# Patient Record
Sex: FEMALE | Race: WHITE | NOT HISPANIC OR LATINO | Employment: FULL TIME | URBAN - METROPOLITAN AREA
[De-identification: names, ages, dates, MRNs, and addresses within clinical notes are randomized per-mention and may not be internally consistent; named-entity substitution may affect disease eponyms.]

---

## 2023-08-10 ENCOUNTER — OFFICE VISIT (OUTPATIENT)
Dept: PRIMARY CARE | Facility: CLINIC | Age: 23
End: 2023-08-10
Payer: COMMERCIAL

## 2023-08-10 ENCOUNTER — LAB (OUTPATIENT)
Dept: LAB | Facility: LAB | Age: 23
End: 2023-08-10
Payer: COMMERCIAL

## 2023-08-10 VITALS
DIASTOLIC BLOOD PRESSURE: 60 MMHG | BODY MASS INDEX: 20.38 KG/M2 | HEIGHT: 63 IN | WEIGHT: 115 LBS | SYSTOLIC BLOOD PRESSURE: 98 MMHG

## 2023-08-10 DIAGNOSIS — R53.83 OTHER FATIGUE: Primary | ICD-10-CM

## 2023-08-10 DIAGNOSIS — R53.83 OTHER FATIGUE: ICD-10-CM

## 2023-08-10 PROBLEM — J11.1 FLU: Status: ACTIVE | Noted: 2023-08-10

## 2023-08-10 PROBLEM — R05.9 COUGH: Status: ACTIVE | Noted: 2023-08-10

## 2023-08-10 PROBLEM — R09.81 NASAL CONGESTION: Status: ACTIVE | Noted: 2023-08-10

## 2023-08-10 PROBLEM — E78.1 HIGH TRIGLYCERIDES: Status: ACTIVE | Noted: 2023-08-10

## 2023-08-10 PROBLEM — J03.00 ACUTE NON-RECURRENT STREPTOCOCCAL TONSILLITIS: Status: ACTIVE | Noted: 2023-08-10

## 2023-08-10 PROBLEM — M54.2 NECK PAIN: Status: ACTIVE | Noted: 2023-08-10

## 2023-08-10 PROBLEM — R51.9 HEADACHE: Status: ACTIVE | Noted: 2023-08-10

## 2023-08-10 PROBLEM — J02.9 VIRAL PHARYNGITIS: Status: ACTIVE | Noted: 2023-08-10

## 2023-08-10 PROBLEM — B00.1 HERPES LABIALIS: Status: ACTIVE | Noted: 2023-08-10

## 2023-08-10 PROBLEM — L70.9 ACNE: Status: ACTIVE | Noted: 2023-08-10

## 2023-08-10 PROBLEM — F41.9 ANXIETY: Status: ACTIVE | Noted: 2023-08-10

## 2023-08-10 PROBLEM — R20.2 PARESTHESIA: Status: ACTIVE | Noted: 2023-08-10

## 2023-08-10 PROBLEM — K12.0 CANKER SORES ORAL: Status: ACTIVE | Noted: 2023-08-10

## 2023-08-10 PROBLEM — R00.2 PALPITATIONS: Status: ACTIVE | Noted: 2023-08-10

## 2023-08-10 PROBLEM — J03.90 TONSILLITIS: Status: ACTIVE | Noted: 2023-08-10

## 2023-08-10 PROBLEM — R09.81 SINUS CONGESTION: Status: ACTIVE | Noted: 2023-08-10

## 2023-08-10 PROBLEM — R11.2 NAUSEA AND VOMITING: Status: ACTIVE | Noted: 2023-08-10

## 2023-08-10 LAB
ALANINE AMINOTRANSFERASE (SGPT) (U/L) IN SER/PLAS: 8 U/L (ref 7–45)
ALBUMIN (G/DL) IN SER/PLAS: 4.4 G/DL (ref 3.4–5)
ALKALINE PHOSPHATASE (U/L) IN SER/PLAS: 51 U/L (ref 33–110)
ANION GAP IN SER/PLAS: 11 MMOL/L (ref 10–20)
ASPARTATE AMINOTRANSFERASE (SGOT) (U/L) IN SER/PLAS: 14 U/L (ref 9–39)
BILIRUBIN TOTAL (MG/DL) IN SER/PLAS: 0.8 MG/DL (ref 0–1.2)
CALCIUM (MG/DL) IN SER/PLAS: 9.6 MG/DL (ref 8.6–10.6)
CARBON DIOXIDE, TOTAL (MMOL/L) IN SER/PLAS: 28 MMOL/L (ref 21–32)
CHLORIDE (MMOL/L) IN SER/PLAS: 104 MMOL/L (ref 98–107)
CREATININE (MG/DL) IN SER/PLAS: 0.78 MG/DL (ref 0.5–1.05)
ERYTHROCYTE DISTRIBUTION WIDTH (RATIO) BY AUTOMATED COUNT: 12.2 % (ref 11.5–14.5)
ERYTHROCYTE MEAN CORPUSCULAR HEMOGLOBIN CONCENTRATION (G/DL) BY AUTOMATED: 32.6 G/DL (ref 32–36)
ERYTHROCYTE MEAN CORPUSCULAR VOLUME (FL) BY AUTOMATED COUNT: 89 FL (ref 80–100)
ERYTHROCYTES (10*6/UL) IN BLOOD BY AUTOMATED COUNT: 5.4 X10E12/L (ref 4–5.2)
GFR FEMALE: >90 ML/MIN/1.73M2
GLUCOSE (MG/DL) IN SER/PLAS: 72 MG/DL (ref 74–99)
HEMATOCRIT (%) IN BLOOD BY AUTOMATED COUNT: 47.8 % (ref 36–46)
HEMOGLOBIN (G/DL) IN BLOOD: 15.6 G/DL (ref 12–16)
LEUKOCYTES (10*3/UL) IN BLOOD BY AUTOMATED COUNT: 7.2 X10E9/L (ref 4.4–11.3)
NRBC (PER 100 WBCS) BY AUTOMATED COUNT: 0 /100 WBC (ref 0–0)
PLATELETS (10*3/UL) IN BLOOD AUTOMATED COUNT: 233 X10E9/L (ref 150–450)
POTASSIUM (MMOL/L) IN SER/PLAS: 4 MMOL/L (ref 3.5–5.3)
PROTEIN TOTAL: 7.2 G/DL (ref 6.4–8.2)
SODIUM (MMOL/L) IN SER/PLAS: 139 MMOL/L (ref 136–145)
THYROTROPIN (MIU/L) IN SER/PLAS BY DETECTION LIMIT <= 0.05 MIU/L: 1.07 MIU/L (ref 0.44–3.98)
UREA NITROGEN (MG/DL) IN SER/PLAS: 15 MG/DL (ref 6–23)

## 2023-08-10 PROCEDURE — 36415 COLL VENOUS BLD VENIPUNCTURE: CPT

## 2023-08-10 PROCEDURE — 86003 ALLG SPEC IGE CRUDE XTRC EA: CPT

## 2023-08-10 PROCEDURE — 1036F TOBACCO NON-USER: CPT | Performed by: INTERNAL MEDICINE

## 2023-08-10 PROCEDURE — 82785 ASSAY OF IGE: CPT

## 2023-08-10 PROCEDURE — 85027 COMPLETE CBC AUTOMATED: CPT

## 2023-08-10 PROCEDURE — 80053 COMPREHEN METABOLIC PANEL: CPT

## 2023-08-10 PROCEDURE — 84443 ASSAY THYROID STIM HORMONE: CPT

## 2023-08-10 PROCEDURE — 99213 OFFICE O/P EST LOW 20 MIN: CPT | Performed by: INTERNAL MEDICINE

## 2023-08-10 RX ORDER — SPIRONOLACTONE 100 MG/1
100 TABLET, FILM COATED ORAL NIGHTLY
COMMUNITY
Start: 2023-08-01 | End: 2024-04-25 | Stop reason: WASHOUT

## 2023-08-10 RX ORDER — NORGESTIMATE AND ETHINYL ESTRADIOL 7DAYSX3 LO
1 KIT ORAL DAILY
COMMUNITY
End: 2024-01-22 | Stop reason: SDUPTHER

## 2023-08-10 ASSESSMENT — ENCOUNTER SYMPTOMS: FATIGUE: 1

## 2023-08-10 NOTE — PROGRESS NOTES
"Subjective   Patient ID: Flor Potter is a 23 y.o. female who presents for Fatigue (On going x1 month).    Fatigue  Associated symptoms include fatigue.   She presents with complaints of having extreme fatigue for past 1 months she saw her psychic and energy healer and they recommended to get checked for thyroid  She is always stuffy she gets sometimes wheezy in the lung  She does have a daughter    Patient is non-smoker social drinker  Does office job    Past recap   Patient is here with complaints of having sinus congestion chest congestion headache stomach hurting  Her mother is positive for Covid she contracted infection from her work  Patient denies any shortness of breath denies cough        to est PCP   feel emotional off and on   In April patient felt very emotional for few days and then she felt better until a week ago when she started feeling very emotional again  She has regular periods  She feels this time it's happening a week before her period  Smallest things ruins her day, extra since still gets upset easily, crying a lot  She does have a boyfriend but no breakouts  She is a day scholar, she is taking summer courses and working as   Not enjoying the work  Was on Accutane until a year ago for acne     Review of Systems   Constitutional:  Positive for fatigue.       Objective   BP 98/60   Ht 1.6 m (5' 3\")   Wt 52.2 kg (115 lb)   BMI 20.37 kg/m²     Physical Exam  Vitals reviewed.   Constitutional:       Appearance: Normal appearance.   HENT:      Head: Normocephalic and atraumatic.      Right Ear: Tympanic membrane, ear canal and external ear normal.      Left Ear: Tympanic membrane, ear canal and external ear normal.      Nose: Nose normal.      Mouth/Throat:      Pharynx: Oropharynx is clear.   Eyes:      Extraocular Movements: Extraocular movements intact.      Conjunctiva/sclera: Conjunctivae normal.      Pupils: Pupils are equal, round, and reactive to light. "   Cardiovascular:      Rate and Rhythm: Normal rate and regular rhythm.      Pulses: Normal pulses.      Heart sounds: Normal heart sounds.   Pulmonary:      Effort: Pulmonary effort is normal.      Breath sounds: Normal breath sounds.   Abdominal:      General: Abdomen is flat. Bowel sounds are normal.      Palpations: Abdomen is soft.   Musculoskeletal:      Cervical back: Normal range of motion and neck supple.   Skin:     General: Skin is warm and dry.   Neurological:      General: No focal deficit present.      Mental Status: She is alert and oriented to person, place, and time.   Psychiatric:         Mood and Affect: Mood normal.       Assessment/Plan   Problem List Items Addressed This Visit    None  Visit Diagnoses       Other fatigue    -  Primary    Relevant Orders    CBC (Completed)    Comprehensive Metabolic Panel (Completed)    TSH with reflex to Free T4 if abnormal (Completed)    Respiratory Allergy Profile IgE (Completed)    Food Allergy Profile IgE (Completed)          past recap  Patient's old chart review  #1 high triglycerides  Will recheck if it was just related to Accutane  #2 palpitations  We will do blood work to rule out any medical cause  #3 anxiety  Long counseling session done  Patient has questions about supplements explain not able to give much information but she can try aromatic therapies which does help her relax  Advised the over and breathing exercises  Advised to consider lifestyle   Patient does not want to take medications but if her anxiety is are not better she should be on medication  Follow-up after blood work     11/24  Covid testing ordered for patient  Advised patient to quarantine  Stay hydrated and rest     8/10/2023  Will order thyroid test  CBC to rule out anemia  Will do allergy test to see if patient has allergies causing sinus congestion  Sinus congestion could be causing her fatigue to  Follow-up after the test

## 2023-08-11 LAB
ALLERGEN ANIMAL: CAT DANDER IGE (KU/L): <0.1 KU/L
ALLERGEN ANIMAL: DOG DANDER IGE (KU/L): 0.12 KU/L
ALLERGEN FOOD: CLAM (RUDITAPES SPP.) IGE (KU/L): <0.1 KU/L
ALLERGEN FOOD: EGG WHITE IGE (KU/L): <0.1 KU/L
ALLERGEN FOOD: FISH (COD) GADUS MORHUA) IGE (KU/L): <0.1 KU/L
ALLERGEN FOOD: MAIZE, CORN (ZEA MAYS) IGE (KU/L): <0.1 KU/L
ALLERGEN FOOD: MILK IGE (KU/L): <0.1 KU/L
ALLERGEN FOOD: PEANUT (ARACHIS HYPOGAEA) IGE (KU/L): <0.1 KU/L
ALLERGEN FOOD: SCALLOP (PECTEN SPP.) IGE (KU/L): <0.1 KU/L
ALLERGEN FOOD: SESAME SEED (SESAMUM INDICUM) IGE (KU/L): <0.1 KU/L
ALLERGEN FOOD: SHRIMP (P. BOREALIS/MONODON, M. BARBATA/JOYNERI) IGE (KU/L): <0.1 KU/L
ALLERGEN FOOD: SOYBEAN (GLYCINE MAX) IGE (KU/L): <0.1 KU/L
ALLERGEN FOOD: WALNUT (JUGLANS SPP.) IGE (KU/L): <0.1 KU/L
ALLERGEN FOOD: WHEAT (TRITICUM AESTIVUM) IGE (KU/L): <0.1 KU/L
ALLERGEN GRASS: BERMUDA GRASS (CYNODON DACTYLON) IGE (KU/L): <0.1 KU/L
ALLERGEN GRASS: JOHNSON GRASS (SORGHUM HALEPENSE) IGE (KU/L): <0.1 KU/L
ALLERGEN GRASS: MEADOW GRASS, KENTUCKY BLUE (POA PRATENSIS )IGE (KU/L): <0.1 KU/L
ALLERGEN GRASS: TIMOTHY GRASS (PHLEUM PRATENSE) IGE (KU/L): <0.1 KU/L
ALLERGEN INSECT: COCKROACH IGE: <0.1 KU/L
ALLERGEN MICROORGANISM: ALTERNARIA ALTERNATA IGE (KU/L): <0.1 KU/L
ALLERGEN MICROORGANISM: ASPERGILLUS FUMIGATUS IGE (KU/L): <0.1 KU/L
ALLERGEN MICROORGANISM: CLADOSPORIUM HERBARUM IGE (KU/L): <0.1 KU/L
ALLERGEN MICROORGANISM: PENICILLIUM CHRYSOGENUM (P. NOTATUM) IGE (KU/L): <0.1 KU/L
ALLERGEN MITE: DERMATOPHAGOIDES FARINAE (HOUSE DUST MITE) IGE (KU/L): 0.26 KU/L
ALLERGEN MITE: DERMATOPHAGOIDES PTERONYSSINUS (HOUSE DUST MITE) IGE (KU/L): 0.44 KU/L
ALLERGEN TREE: BOX-ELDER (ACER NEGUNDO) IGE (KU/L): 1.22 KU/L
ALLERGEN TREE: COMMON SILVER BIRCH (BETULA VERRUCOSA) IGE (KU/L): <0.1 KU/L
ALLERGEN TREE: COTTONWOOD (POPULUS DELTOIDES) IGE (KU/L): <0.1 KU/L
ALLERGEN TREE: ELM (ULMUS AMERICANA) IGE (KU/L): <0.1 KU/L
ALLERGEN TREE: MAPLE LEAF SYCAMORE, LONDON PLANE IGE (KU/L): <0.1 KU/L
ALLERGEN TREE: MOUNTAIN JUNIPER (JUNIPERUS SABINOIDES) IGE (KU/L): <0.1 KU/L
ALLERGEN TREE: MULBERRY (MORUS ALBA) IGE (KU/L): <0.1 KU/L
ALLERGEN TREE: OAK (QUERCUS ALBA) IGE (KU/L): <0.1 KU/L
ALLERGEN TREE: PECAN, HICKORY (CARYA PECAN) IGE (KU/L): <0.1 KU/L
ALLERGEN TREE: WALNUT IGE: <0.1 KU/L
ALLERGEN TREE: WHITE ASH (FRAXINUS AMERICANA) IGE (KU/L): <0.1 KU/L
ALLERGEN WEED: COMMON PIGWEED (AMARANTHUS RETROFLEXUS) IGE (KU/L): <0.1 KU/L
ALLERGEN WEED: COMMON RAGWEED (AMB. ARTEMISIIFOLIA/A. ELATIOR) IGE (KU/L): <0.1 KU/L
ALLERGEN WEED: GOOSEFOOT, LAMB'S QUARTERS (CHENOPODIUM ALBUM) IGE (KU/L): <0.1 KU/L
ALLERGEN WEED: PLANTAIN (ENGLISH), RIBWORT (PLANTAGO LANCEOLATA) IGE (KU/L): <0.1 KU/L
ALLERGEN WEED: PRICKLY SALTWORT/RUSSIAN THISTLE (SALSOLA KALI) IGE (KU/L): <0.1 KU/L
ALLERGEN WEED: SHEEP SORREL (RUMEX ACETOSELLA) IGE (KU/L): <0.1 KU/L
IMMUNOCAP IGE: 370 KU/L (ref 0–214)
IMMUNOCAP INTERPRETATION: ABNORMAL
IMMUNOCAP INTERPRETATION: NORMAL

## 2023-08-17 ENCOUNTER — APPOINTMENT (OUTPATIENT)
Dept: PRIMARY CARE | Facility: CLINIC | Age: 23
End: 2023-08-17
Payer: COMMERCIAL

## 2023-08-24 ENCOUNTER — OFFICE VISIT (OUTPATIENT)
Dept: PRIMARY CARE | Facility: CLINIC | Age: 23
End: 2023-08-24
Payer: COMMERCIAL

## 2023-08-24 VITALS
BODY MASS INDEX: 20.14 KG/M2 | SYSTOLIC BLOOD PRESSURE: 102 MMHG | HEIGHT: 64 IN | WEIGHT: 118 LBS | DIASTOLIC BLOOD PRESSURE: 64 MMHG

## 2023-08-24 DIAGNOSIS — R05.1 ACUTE COUGH: ICD-10-CM

## 2023-08-24 DIAGNOSIS — E16.2 LOW BLOOD SUGAR: Primary | ICD-10-CM

## 2023-08-24 DIAGNOSIS — R71.8 ELEVATED HEMATOCRIT: ICD-10-CM

## 2023-08-24 DIAGNOSIS — B37.9 YEAST INFECTION: ICD-10-CM

## 2023-08-24 PROCEDURE — 99213 OFFICE O/P EST LOW 20 MIN: CPT | Performed by: INTERNAL MEDICINE

## 2023-08-24 PROCEDURE — 1036F TOBACCO NON-USER: CPT | Performed by: INTERNAL MEDICINE

## 2023-08-24 RX ORDER — NYSTATIN 100000 U/G
CREAM TOPICAL 2 TIMES DAILY
Qty: 30 G | Refills: 0 | Status: SHIPPED | OUTPATIENT
Start: 2023-08-24 | End: 2023-08-31

## 2023-08-24 RX ORDER — FLUCONAZOLE 150 MG/1
TABLET ORAL
COMMUNITY
End: 2023-08-24 | Stop reason: SDUPTHER

## 2023-08-24 RX ORDER — FLUCONAZOLE 150 MG/1
TABLET ORAL
Qty: 1 TABLET | Refills: 1 | Status: SHIPPED | OUTPATIENT
Start: 2023-08-24

## 2023-08-24 NOTE — PROGRESS NOTES
"Subjective   Patient ID: Flor Potter is a 23 y.o. female who presents for Follow-up.    HPI   Patient is here for follow-up on the blood work  She has questions about low blood sugar and elevated RBC and hematocrit on the blood work  Complaining of having vaginal infection gets fungal infection she blames it to the birth control.  She is really concerned about taking medication but it gives her side effects  She wants Diflucan as nystatin does not work well for her  She does hot yoga for 5 times a week  She did not do the lung function test      Past recap   she presents with complaints of having extreme fatigue for past 1 months she saw her psychic and energy healer and they recommended to get checked for thyroid  She is always stuffy she gets sometimes wheezy in the lung     Patient is non-smoker social drinker  Does office job     Patient is here with complaints of having sinus congestion chest congestion headache stomach hurting  Her mother is positive for Covid she contracted infection from her work  Patient denies any shortness of breath denies cough     to est PCP   feel emotional off and on   In April patient felt very emotional for few days and then she felt better until a week ago when she started feeling very emotional again  She has regular periods  She feels this time it's happening a week before her period  Smallest things ruins her day, extra since still gets upset easily, crying a lot  She does have a boyfriend but no breakouts  She is a day scholar, she is taking summer courses and working as   Not enjoying the work  Was on Accutane until a year ago for acne   Review of Systems    Objective   /64   Ht 1.626 m (5' 4\")   Wt 53.5 kg (118 lb)   BMI 20.25 kg/m²     Physical Exam  Vitals reviewed.   Constitutional:       Appearance: Normal appearance.   HENT:      Head: Normocephalic and atraumatic.      Right Ear: Tympanic membrane, ear canal and external ear normal.      " Left Ear: Tympanic membrane, ear canal and external ear normal.      Nose: Nose normal.      Mouth/Throat:      Pharynx: Oropharynx is clear.   Eyes:      Extraocular Movements: Extraocular movements intact.      Conjunctiva/sclera: Conjunctivae normal.      Pupils: Pupils are equal, round, and reactive to light.   Cardiovascular:      Rate and Rhythm: Normal rate and regular rhythm.      Pulses: Normal pulses.      Heart sounds: Normal heart sounds.   Pulmonary:      Effort: Pulmonary effort is normal.      Breath sounds: Normal breath sounds.   Abdominal:      General: Abdomen is flat. Bowel sounds are normal.      Palpations: Abdomen is soft.   Musculoskeletal:      Cervical back: Normal range of motion and neck supple.   Skin:     General: Skin is warm and dry.   Neurological:      General: No focal deficit present.      Mental Status: She is alert and oriented to person, place, and time.   Psychiatric:         Mood and Affect: Mood normal.         Assessment/Plan   Problem List Items Addressed This Visit          Pulmonary and Pneumonias    Cough    Relevant Orders    Pulmonary function testing     Other Visit Diagnoses       Low blood sugar    -  Primary    Yeast infection        Relevant Medications    fluconazole (Diflucan) 150 mg tablet    nystatin (Mycostatin) cream    Elevated hematocrit                past recap  Patient's old chart review  #1 high triglycerides  Will recheck if it was just related to Accutane  #2 palpitations  We will do blood work to rule out any medical cause  #3 anxiety  Long counseling session done  Patient has questions about supplements explain not able to give much information but she can try aromatic therapies which does help her relax  Advised the over and breathing exercises  Advised to consider lifestyle   Patient does not want to take medications but if her anxiety is are not better she should be on medication  Follow-up after blood work     11/24  Covid testing ordered  for patient  Advised patient to quarantine  Stay hydrated and rest      8/10/2023  Will order thyroid test  CBC to rule out anemia  Will do allergy test to see if patient has allergies causing sinus congestion  Sinus congestion could be causing her fatigue to  Follow-up after the test    8/24/2023  Blood work reviewed  Low blood sugar reactive hypoglycemia  Increase water fiber intake  Elevated hematocrit most likely from dehydration increase water intake  PFTs  Vaginal infection probably from heart failure she does for 5 times a week as patient does not change the clothes right away  Encouraged avoid sweating keep the skin dry and keep cotton clothing  Prescription for Diflucan and nystatin cream given

## 2023-12-15 ENCOUNTER — LAB (OUTPATIENT)
Dept: LAB | Facility: LAB | Age: 23
End: 2023-12-15
Payer: COMMERCIAL

## 2023-12-15 DIAGNOSIS — B35.1 TINEA UNGUIUM: Primary | ICD-10-CM

## 2023-12-15 LAB
ALBUMIN SERPL-MCNC: 4.1 G/DL (ref 3.5–5)
ALP BLD-CCNC: 49 U/L (ref 35–125)
ALT SERPL-CCNC: 11 U/L (ref 5–40)
AST SERPL-CCNC: 18 U/L (ref 5–40)
BILIRUB DIRECT SERPL-MCNC: <0.2 MG/DL (ref 0–0.2)
BILIRUB SERPL-MCNC: 0.4 MG/DL (ref 0.1–1.2)
PROT SERPL-MCNC: 6.4 G/DL (ref 5.9–7.9)

## 2023-12-15 PROCEDURE — 80076 HEPATIC FUNCTION PANEL: CPT

## 2023-12-15 PROCEDURE — 36415 COLL VENOUS BLD VENIPUNCTURE: CPT

## 2024-01-22 DIAGNOSIS — Z30.41 SURVEILLANCE FOR BIRTH CONTROL, ORAL CONTRACEPTIVES: Primary | ICD-10-CM

## 2024-01-22 RX ORDER — NORGESTIMATE AND ETHINYL ESTRADIOL 7DAYSX3 LO
1 KIT ORAL DAILY
Qty: 28 TABLET | Refills: 4 | Status: SHIPPED | OUTPATIENT
Start: 2024-01-22 | End: 2024-04-25 | Stop reason: SDUPTHER

## 2024-04-25 ENCOUNTER — OFFICE VISIT (OUTPATIENT)
Dept: OBSTETRICS AND GYNECOLOGY | Facility: CLINIC | Age: 24
End: 2024-04-25
Payer: COMMERCIAL

## 2024-04-25 VITALS
DIASTOLIC BLOOD PRESSURE: 80 MMHG | WEIGHT: 125.6 LBS | BODY MASS INDEX: 21.44 KG/M2 | HEIGHT: 64 IN | SYSTOLIC BLOOD PRESSURE: 114 MMHG

## 2024-04-25 DIAGNOSIS — Z23 NEED FOR HPV VACCINATION: ICD-10-CM

## 2024-04-25 DIAGNOSIS — Z30.41 SURVEILLANCE FOR BIRTH CONTROL, ORAL CONTRACEPTIVES: ICD-10-CM

## 2024-04-25 DIAGNOSIS — Z11.3 SCREEN FOR STD (SEXUALLY TRANSMITTED DISEASE): ICD-10-CM

## 2024-04-25 DIAGNOSIS — Z01.419 WELL WOMAN EXAM: Primary | ICD-10-CM

## 2024-04-25 PROCEDURE — 99395 PREV VISIT EST AGE 18-39: CPT | Performed by: OBSTETRICS & GYNECOLOGY

## 2024-04-25 PROCEDURE — 90471 IMMUNIZATION ADMIN: CPT | Performed by: OBSTETRICS & GYNECOLOGY

## 2024-04-25 PROCEDURE — 90651 9VHPV VACCINE 2/3 DOSE IM: CPT | Performed by: OBSTETRICS & GYNECOLOGY

## 2024-04-25 RX ORDER — NORGESTIMATE AND ETHINYL ESTRADIOL 7DAYSX3 LO
KIT ORAL
Qty: 84 TABLET | Refills: 3 | Status: SHIPPED | OUTPATIENT
Start: 2024-04-25 | End: 2024-06-03 | Stop reason: SDUPTHER

## 2024-04-25 ASSESSMENT — ENCOUNTER SYMPTOMS
OCCASIONAL FEELINGS OF UNSTEADINESS: 0
DEPRESSION: 0
LOSS OF SENSATION IN FEET: 0

## 2024-04-25 ASSESSMENT — LIFESTYLE VARIABLES
AUDIT-C TOTAL SCORE: 1
SKIP TO QUESTIONS 9-10: 1
HOW OFTEN DO YOU HAVE SIX OR MORE DRINKS ON ONE OCCASION: NEVER
HOW OFTEN DO YOU HAVE A DRINK CONTAINING ALCOHOL: MONTHLY OR LESS
HOW MANY STANDARD DRINKS CONTAINING ALCOHOL DO YOU HAVE ON A TYPICAL DAY: 1 OR 2

## 2024-04-25 ASSESSMENT — PATIENT HEALTH QUESTIONNAIRE - PHQ9
1. LITTLE INTEREST OR PLEASURE IN DOING THINGS: NOT AT ALL
SUM OF ALL RESPONSES TO PHQ9 QUESTIONS 1 & 2: 0
2. FEELING DOWN, DEPRESSED OR HOPELESS: NOT AT ALL

## 2024-04-25 ASSESSMENT — PAIN SCALES - GENERAL: PAINLEVEL: 0-NO PAIN

## 2024-04-25 NOTE — PROGRESS NOTES
Patient Name:  Flor Potter  :  2000  MR #:  [id number]  Acct #:  [id number]      Assessment:  1. Well woman exam      2. Screen for STD (sexually transmitted disease)      3. Surveillance for birth control, oral contraceptives    - Tri-Lo-Jessica 0.18/0.215/0.25 mg-25 mcg tablet; Take 1 tablet PO daily  Dispense: 84 tablet; Refill: 3    4. Need for HPV vaccination    - HPV 9-valent vaccine (GARDASIL 9)     -PAP every 3 years if normal. Next pap due   -STI screening declined  -Birth control counseling:  Barrier methods of contraception encouraged for BC and for prevention of STIs.  Selecting Ortho Tricyclen for skin tx also.  -Preventative hygiene habits.  -Health promoting habits: Exercise ie. aerobics, yoga/pilates, weight bearing exercises 3 to 5 days/week. Diet ie. Meals with balanced portions of healthy fats, complex carbohydrates, and protein;  Take in adequate calcium and vitamin D3; drink plenty of water each day.  -Medication education:  Education:  All new and/or current medications discussed and reviewed including side effects with patient/caregiver, Understanding:  Caregiver/Patient expressed understanding., Adherence:  Barriers to adherence identified and discussed if present.  -Vaccine counseling: Finish series for full protection      Chief Complaint:  Annual exam    Subjective:  HPI:  25 yo CF for annual exam. Desires STD screen, BC. Started Gardasil vaccination last year.          Birth control: OCP        Cycles: Regular.     GYNH:   Yes, first onset 9  LMP:  Patient's last menstrual period was 2024.  HPV Vaccine Has not completed series from . Overdue for 3rd injection.  Sexual activity Yes - . Coitarche Yes - .  Birth control history     Past Medical History:   Diagnosis Date    Acne, unspecified     Acne, unspecified acne type    Encounter for immunization     Varicella vaccination    Encounter for immunization     Need for HPV vaccination        Past Surgical History:    Procedure Laterality Date    OTHER SURGICAL HISTORY  10/13/2016    Biopsy Skin        OB History          0    Para   0    Term   0       0    AB   0    Living   0         SAB   0    IAB   0    Ectopic   0    Multiple   0    Live Births   0                  Prior to Admission medications    Medication Sig Start Date End Date Taking? Authorizing Provider   fluconazole (Diflucan) 150 mg tablet Take 1 tab po once, repeat in 72 hours if no improvement, qty # 3 23   Melanie Treadwell MD   Tri-Lo-Jessica 0.18/0.215/0.25 mg-25 mcg tablet Take 1 tablet by mouth once daily. 24   Ekaterina Celaya DO   spironolactone (Aldactone) 100 mg tablet Take 1 tablet (100 mg) by mouth once daily at bedtime. 23  Historical Provider, MD       Allergies   Allergen Reactions    Amoxicillin Anaphylaxis and Rash    Minocycline Hives       ROS:   [facility] - WOMEN ONLY:          Breast Lump No acute changes.  Hot Flashes Occasional.  Painful [place] no.  Vaginal Discharge no.      [facility] - ROS Update:          Unexplained Weight Change no.  Pain anywhere in your body no.  Black or bloody stools no.  Problems with urination no.  Rashes or sores Acne (hx of spironolactone  use).  Sexual problems no.  Depression or anxiety problems no.  Do you feel threatened by anyone No.          Objective:  Physical Examination:      GENERAL:          General Appearance:  well-developed, well-nourished, no functional handicap, well-groomed.          Hygiene:  good.          Ill-appearance:  none.          Mental Status:  alert and oriented.          Mood/Affect:  pleasant, appropriate.          Speech:  clear.          Eye contact:  normal.          Appears stated age:  yes.          Labs reviewed during visit  yes - previous STD screens and Pap smears.             LUNGS:          Effort:  no respiratory distress.      HEART:         HRRR with S1/S2 w/o M/C/R     BREASTS:          General:  no masses, no tenderness, no skin changes, no  nipple abnormality, and no axillary lymphadenopathy.      ABDOMEN:          Tenderness:  none.          Distention:  none.      GENITOURINARY - FEMALE:          Bladder:  normal .          Pelvic support defects:  not seen .          External genitalia:  Normal.          Urethra:  Normal.          Vagina:  no lesions, moderate discharge, STI screens collected No.          Cervix/ cuff:  normal appearance .          Uterus:  normal size/shape/consistency, non tender.          Adnexa:  normal , non tender.      DERMATOLOGY:          Skin:  hormonal acne     EXTREMITIES:          Normal:  no anomalies.          Edema:  none.      NEUROLOGICAL:          Orientation:  alert and oriented x 3.      PSYCHOLOGY:          Affect:  appropriate.          Mood:  pleasant.

## 2024-06-03 DIAGNOSIS — Z30.41 SURVEILLANCE FOR BIRTH CONTROL, ORAL CONTRACEPTIVES: ICD-10-CM

## 2024-06-03 RX ORDER — NORGESTIMATE AND ETHINYL ESTRADIOL 7DAYSX3 LO
KIT ORAL
Qty: 84 TABLET | Refills: 3 | Status: SHIPPED | OUTPATIENT
Start: 2024-06-03

## 2024-08-01 ENCOUNTER — TELEPHONE (OUTPATIENT)
Dept: OBSTETRICS AND GYNECOLOGY | Facility: CLINIC | Age: 24
End: 2024-08-01
Payer: COMMERCIAL

## 2024-08-01 NOTE — TELEPHONE ENCOUNTER
Patient's mother called and left message stating she was having some UTI symptoms, was treated for UTI, but when the culture came back it was negative for a UTI and stated she was pos for strep B. . I called the daughter back to get more information.    Duration: since Sunday- Quickly  Symptoms: Cramping, burning on urination, urgency, feels like minor spasms. Pt states on Monday she had some blood/green tint discharge.     Nitrofuranton given on Sunday from [facility].     Appt made for 8/8/2024

## 2024-08-02 NOTE — TELEPHONE ENCOUNTER
Pt called back and LVM- stated she took a UTI test and it came back positive, but the nitrofurantoin they put her on is NOT working. She would like something else sent in. Please advise.

## 2024-08-03 ENCOUNTER — PATIENT MESSAGE (OUTPATIENT)
Dept: OBSTETRICS AND GYNECOLOGY | Facility: CLINIC | Age: 24
End: 2024-08-03
Payer: COMMERCIAL

## 2024-08-03 DIAGNOSIS — R39.9 UTI SYMPTOMS: Primary | ICD-10-CM

## 2024-08-03 RX ORDER — SULFAMETHOXAZOLE AND TRIMETHOPRIM 800; 160 MG/1; MG/1
1 TABLET ORAL 2 TIMES DAILY
Qty: 10 TABLET | Refills: 0 | Status: SHIPPED | OUTPATIENT
Start: 2024-08-03 | End: 2024-08-08

## 2024-08-04 NOTE — PROGRESS NOTES
Macrobid initially sent for her c/o UTI sxs. No resolution of sxs and no cx available. She completed a store test. Sent Bactrim DS to the pharmacy on file.

## 2024-08-08 ENCOUNTER — APPOINTMENT (OUTPATIENT)
Dept: OBSTETRICS AND GYNECOLOGY | Facility: CLINIC | Age: 24
End: 2024-08-08
Payer: COMMERCIAL

## 2024-08-21 ENCOUNTER — TELEPHONE (OUTPATIENT)
Dept: OBSTETRICS AND GYNECOLOGY | Facility: CLINIC | Age: 24
End: 2024-08-21
Payer: COMMERCIAL

## 2024-08-21 NOTE — TELEPHONE ENCOUNTER
Pt called and LVM complaining of UTI symptoms. Pt had not took antibiotics rxd until just recently. She said she is starting to feel better, but antibiotics can upset vag Ph balance as well so she would like to be swabbed too. Pt sched

## 2024-09-04 ENCOUNTER — OFFICE VISIT (OUTPATIENT)
Dept: OBSTETRICS AND GYNECOLOGY | Facility: CLINIC | Age: 24
End: 2024-09-04
Payer: COMMERCIAL

## 2024-09-04 VITALS
SYSTOLIC BLOOD PRESSURE: 109 MMHG | DIASTOLIC BLOOD PRESSURE: 71 MMHG | WEIGHT: 124.6 LBS | HEIGHT: 64 IN | BODY MASS INDEX: 21.27 KG/M2

## 2024-09-04 DIAGNOSIS — R39.9 UTI SYMPTOMS: ICD-10-CM

## 2024-09-04 DIAGNOSIS — Z20.2 POSSIBLE EXPOSURE TO STD: ICD-10-CM

## 2024-09-04 DIAGNOSIS — N76.1 SUBACUTE VAGINITIS: Primary | ICD-10-CM

## 2024-09-04 LAB
POC APPEARANCE, URINE: CLEAR
POC BILIRUBIN, URINE: NEGATIVE
POC BLOOD, URINE: ABNORMAL
POC COLOR, URINE: YELLOW
POC GLUCOSE, URINE: NEGATIVE MG/DL
POC KETONES, URINE: NEGATIVE MG/DL
POC LEUKOCYTES, URINE: ABNORMAL
POC NITRITE,URINE: NEGATIVE
POC PH, URINE: 7 PH
POC PROTEIN, URINE: NEGATIVE MG/DL
POC SPECIFIC GRAVITY, URINE: 1.01
POC UROBILINOGEN, URINE: 0.2 EU/DL

## 2024-09-04 PROCEDURE — 87491 CHLMYD TRACH DNA AMP PROBE: CPT | Mod: WESLAB | Performed by: OBSTETRICS & GYNECOLOGY

## 2024-09-04 PROCEDURE — 99213 OFFICE O/P EST LOW 20 MIN: CPT | Performed by: OBSTETRICS & GYNECOLOGY

## 2024-09-04 PROCEDURE — 87205 SMEAR GRAM STAIN: CPT | Mod: WESLAB | Performed by: OBSTETRICS & GYNECOLOGY

## 2024-09-04 PROCEDURE — 87086 URINE CULTURE/COLONY COUNT: CPT | Mod: WESLAB | Performed by: OBSTETRICS & GYNECOLOGY

## 2024-09-04 PROCEDURE — 87661 TRICHOMONAS VAGINALIS AMPLIF: CPT | Mod: WESLAB | Performed by: OBSTETRICS & GYNECOLOGY

## 2024-09-04 PROCEDURE — 81003 URINALYSIS AUTO W/O SCOPE: CPT | Mod: QW | Performed by: OBSTETRICS & GYNECOLOGY

## 2024-09-04 PROCEDURE — 87109 MYCOPLASMA: CPT | Performed by: OBSTETRICS & GYNECOLOGY

## 2024-09-04 PROCEDURE — 3008F BODY MASS INDEX DOCD: CPT | Performed by: OBSTETRICS & GYNECOLOGY

## 2024-09-04 ASSESSMENT — LIFESTYLE VARIABLES
HOW OFTEN DO YOU HAVE A DRINK CONTAINING ALCOHOL: MONTHLY OR LESS
HOW MANY STANDARD DRINKS CONTAINING ALCOHOL DO YOU HAVE ON A TYPICAL DAY: 1 OR 2
AUDIT-C TOTAL SCORE: 2
SKIP TO QUESTIONS 9-10: 0
HOW OFTEN DO YOU HAVE SIX OR MORE DRINKS ON ONE OCCASION: LESS THAN MONTHLY

## 2024-09-04 ASSESSMENT — ENCOUNTER SYMPTOMS
OCCASIONAL FEELINGS OF UNSTEADINESS: 0
DEPRESSION: 0
LOSS OF SENSATION IN FEET: 0

## 2024-09-04 ASSESSMENT — PATIENT HEALTH QUESTIONNAIRE - PHQ9
1. LITTLE INTEREST OR PLEASURE IN DOING THINGS: NOT AT ALL
2. FEELING DOWN, DEPRESSED OR HOPELESS: NOT AT ALL
SUM OF ALL RESPONSES TO PHQ9 QUESTIONS 1 & 2: 0

## 2024-09-04 ASSESSMENT — SOCIAL DETERMINANTS OF HEALTH (SDOH)
WITHIN THE LAST YEAR, HAVE YOU BEEN AFRAID OF YOUR PARTNER OR EX-PARTNER?: NO
WITHIN THE LAST YEAR, HAVE YOU BEEN KICKED, HIT, SLAPPED, OR OTHERWISE PHYSICALLY HURT BY YOUR PARTNER OR EX-PARTNER?: NO
WITHIN THE LAST YEAR, HAVE YOU BEEN HUMILIATED OR EMOTIONALLY ABUSED IN OTHER WAYS BY YOUR PARTNER OR EX-PARTNER?: NO
WITHIN THE LAST YEAR, HAVE TO BEEN RAPED OR FORCED TO HAVE ANY KIND OF SEXUAL ACTIVITY BY YOUR PARTNER OR EX-PARTNER?: NO

## 2024-09-04 ASSESSMENT — PAIN SCALES - GENERAL: PAINLEVEL: 2

## 2024-09-04 NOTE — PROGRESS NOTES
GYN OFFICE VISIT    Patient Name:  Flor Potter  :  2000  MR #:  [id number]  Acct #:  [id number]      ASSESSMENT/PLAN:     1. Subacute vaginitis    - Vaginitis Gram Stain For Bacterial Vaginosis + Yeast  - Ureaplasma/Mycoplasma Culture    2. UTI symptoms    - POCT UA Automated manually resulted  - Vaginitis Gram Stain For Bacterial Vaginosis + Yeast  - Ureaplasma/Mycoplasma Culture     -Sent swabs and urine for further testing. Sent literature regarding etiology and presentation of above to [facility].  -Declined serum testing for STIs  -Medication education:   All new and/or current medications discussed and reviewed including side effects with patient/caregiver, Understanding:  Caregiver/Patient expressed understanding., Adherence:  Barriers to adherence identified and discussed if present.  -Preventative hygiene/STIs reviewed.       No follow-ups on file.      Chief Complaint   Patient presents with    Vaginitis/Bacterial Vaginosis       Flor Potter is a 24 y.o. C. Female who presents for c/o possible UTI or vaginal infection.   Patient's last menstrual period was 2024 (exact date)..  New partner. Trouble with UTI sxs for >a week. She has a discharge, occasional odor.      Past Medical History:   Diagnosis Date    Acne, unspecified     Acne, unspecified acne type    Encounter for immunization     Varicella vaccination    Encounter for immunization     Need for HPV vaccination       Social History     Tobacco Use    Smoking status: Never    Smokeless tobacco: Never   Vaping Use    Vaping status: Never Used   Substance Use Topics    Alcohol use: Yes    Drug use: Never        Family History   Problem Relation Name Age of Onset    No Known Problems Mother      No Known Problems Father           Current Outpatient Medications:     Tri-Lo-Jessica 0.18/0.215/0.25 mg-25 mcg tablet, Take 1 tablet PO daily, Disp: 84 tablet, Rfl: 3    fluconazole (Diflucan) 150 mg tablet, Take 1 tab po once, repeat in 72 hours if  "no improvement, qty # 3, Disp: 1 tablet, Rfl: 1     Allergies   Allergen Reactions    Amoxicillin Anaphylaxis and Rash    Minocycline Hives       ROS:  [facility] - GENERAL:          Chills no.  Fever no.  Loss of Weight no.  Sweats no.  Fatigue no.  Weight gain no.      [facility] - GASTROINTESTINAL:          Appetite Poor no.  Bloating no.  Constipation no.  Diarrhea no.  Indigestion no.  Nausea no.  Stomach Pain no  Vomiting no.    [facility] - GENITO-URINARY:          Blood in Urine no.  Frequent Urination no.  Lack of Bladder Control no.  Painful Urination no.  Heavy/Irregular periods no/yes .  Vaginal discharge no.  Vaginal itching no.  Post-coital bleeding no.      [facility] - MUSCLE/JOINT/BONE:          Back no.  Joint pain no  Muscle pain no.      [facility] - SKIN:          Bruise easily no.  Itching no.    Rash no.  Sore that won't heal no.  Vulvar Lesions no.        [facility] - ROS Update:          Depression or anxiety problems no.        OBJECTIVE:   /71   Ht 1.626 m (5' 4\")   Wt 56.5 kg (124 lb 9.6 oz)   LMP 09/04/2024 (Exact Date)   BMI 21.39 kg/m²   Body mass index is 21.39 kg/m².     Physical Exam  GENERAL:   General Appearance:  well-developed, well-nourished, no functional handicap, well-groomed.  Hygiene:  good.  Ill-appearance:  none.    ABDOMEN: Tenderness:  none.  Distention:  none.   GENITOURINARY - FEMALE: Bladder:  normal.  Pelvic support defects:  not seen .  External genitalia:  Normal.  Urethra:  mild tenderness  Vagina:  no lesions, moderate discharge, genital swabs collected no/yes.  Cervix/ cuff:  normal appearance .  Uterus:  normal size/shape/consistency, non tender.  Adnexa:  normal , non tender.   DERMATOLOGY:  Skin:  no acute changes  NEUROLOGICAL: Orientation:  alert and oriented x 3.   PSYCHOLOGY: Affect:  appropriate.  Mood:  pleasant.     Previous/current LABS reviewed: Yes    Note: This dictation was generated using Dragon voice recognition software. Please excuse any grammatical or spelling errors that " may have occurred using the system.

## 2024-09-05 LAB
C TRACH RRNA SPEC QL NAA+PROBE: NEGATIVE
CLUE CELLS VAG LPF-#/AREA: NORMAL /[LPF]
N GONORRHOEA DNA SPEC QL PROBE+SIG AMP: NEGATIVE
NUGENT SCORE: 1
T VAGINALIS RRNA SPEC QL NAA+PROBE: NEGATIVE
YEAST VAG WET PREP-#/AREA: NORMAL

## 2024-09-06 LAB — BACTERIA UR CULT: NORMAL

## 2024-09-07 LAB — UREAPLASMA/MYCOPLASMA CULTURE: NORMAL

## 2024-09-09 ENCOUNTER — TELEPHONE (OUTPATIENT)
Dept: OBSTETRICS AND GYNECOLOGY | Facility: CLINIC | Age: 24
End: 2024-09-09
Payer: COMMERCIAL

## 2024-09-09 LAB — UREAPLASMA/MYCOPLASMA CULTURE: NORMAL

## 2024-09-10 NOTE — TELEPHONE ENCOUNTER
Patient has questions about the ureaplasma/ Mycoplasma swab she saw come back on her MyChart. I told her once you reviewed the results that you would message us with plan of treatment and possibly partner treatment too, as it is hard bacteria to cure if positive.

## 2024-09-12 DIAGNOSIS — B37.9 YEAST INFECTION: ICD-10-CM

## 2024-09-12 DIAGNOSIS — B99.9 GENITAL INFECTION: Primary | ICD-10-CM

## 2024-09-12 RX ORDER — ERYTHROMYCIN 250 MG/1
250 TABLET, DELAYED RELEASE ORAL 3 TIMES DAILY
Qty: 30 TABLET | Refills: 0 | Status: SHIPPED | OUTPATIENT
Start: 2024-09-12 | End: 2024-09-22

## 2024-09-12 RX ORDER — FLUCONAZOLE 150 MG/1
TABLET ORAL
Qty: 2 TABLET | Refills: 0 | Status: SHIPPED | OUTPATIENT
Start: 2024-09-12

## 2024-09-24 ENCOUNTER — TELEPHONE (OUTPATIENT)
Dept: OBSTETRICS AND GYNECOLOGY | Facility: CLINIC | Age: 24
End: 2024-09-24
Payer: COMMERCIAL

## 2024-09-24 NOTE — TELEPHONE ENCOUNTER
Est pt calling stating she is about a week into her ATB therapy for Ureaplasma and states her sx's are not improving at all. Please advise.

## 2024-10-01 ENCOUNTER — PATIENT MESSAGE (OUTPATIENT)
Dept: OBSTETRICS AND GYNECOLOGY | Facility: CLINIC | Age: 24
End: 2024-10-01
Payer: COMMERCIAL

## 2024-10-01 DIAGNOSIS — R10.2 PELVIC PAIN IN FEMALE: ICD-10-CM

## 2024-10-01 DIAGNOSIS — B99.9 GENITAL INFECTION: Primary | ICD-10-CM

## 2024-10-01 DIAGNOSIS — R39.9 UTI SYMPTOMS: ICD-10-CM

## 2024-10-01 DIAGNOSIS — N34.1 NGU (NONGONOCOCCAL URETHRITIS): ICD-10-CM

## 2024-10-01 DIAGNOSIS — N76.1 SUBACUTE VAGINITIS: Primary | ICD-10-CM

## 2024-10-01 DIAGNOSIS — B99.9 GENITAL INFECTION: ICD-10-CM

## 2024-10-01 RX ORDER — ERYTHROMYCIN 333 MG/1
333 TABLET, DELAYED RELEASE ORAL 3 TIMES DAILY
Qty: 21 TABLET | Refills: 0 | Status: SHIPPED | OUTPATIENT
Start: 2024-10-01 | End: 2024-10-08

## 2024-10-02 NOTE — PROGRESS NOTES
Patient is still symptomatic for MARYAM.  She completed a low-dose course of erythromycin due to poor tolerance of the previous medications.  Sent a repeat course, higher dose.  Advised of precautions to hold fluconazole until the erythromycin is gone.  Advised of interference with birth control.

## 2024-10-08 ENCOUNTER — APPOINTMENT (OUTPATIENT)
Dept: OBSTETRICS AND GYNECOLOGY | Facility: CLINIC | Age: 24
End: 2024-10-08
Payer: COMMERCIAL

## 2024-10-11 ENCOUNTER — TELEPHONE (OUTPATIENT)
Dept: OBSTETRICS AND GYNECOLOGY | Facility: CLINIC | Age: 24
End: 2024-10-11
Payer: COMMERCIAL

## 2024-10-11 NOTE — TELEPHONE ENCOUNTER
Pt called stating she finished the 2 course of meds for urea/myco plasma and is still having symptoms. She stated that you told her if she was still having symptoms that she would need to be seen. Appt made 10/14.

## 2024-10-14 ENCOUNTER — OFFICE VISIT (OUTPATIENT)
Dept: OBSTETRICS AND GYNECOLOGY | Facility: CLINIC | Age: 24
End: 2024-10-14
Payer: COMMERCIAL

## 2024-10-14 VITALS
HEIGHT: 64 IN | WEIGHT: 128.2 LBS | SYSTOLIC BLOOD PRESSURE: 112 MMHG | BODY MASS INDEX: 21.89 KG/M2 | DIASTOLIC BLOOD PRESSURE: 70 MMHG

## 2024-10-14 DIAGNOSIS — M54.9 BILATERAL BACK PAIN, UNSPECIFIED BACK LOCATION, UNSPECIFIED CHRONICITY: ICD-10-CM

## 2024-10-14 DIAGNOSIS — N34.1 NGU (NONGONOCOCCAL URETHRITIS): ICD-10-CM

## 2024-10-14 DIAGNOSIS — B99.9 GENITAL INFECTION: ICD-10-CM

## 2024-10-14 DIAGNOSIS — R10.2 PELVIC PAIN IN FEMALE: ICD-10-CM

## 2024-10-14 DIAGNOSIS — N76.1 SUBACUTE VAGINITIS: Primary | ICD-10-CM

## 2024-10-14 LAB
POC APPEARANCE, URINE: CLEAR
POC BILIRUBIN, URINE: NEGATIVE
POC BLOOD, URINE: NEGATIVE
POC COLOR, URINE: YELLOW
POC GLUCOSE, URINE: NEGATIVE MG/DL
POC KETONES, URINE: NEGATIVE MG/DL
POC LEUKOCYTES, URINE: NEGATIVE
POC NITRITE,URINE: NEGATIVE
POC PH, URINE: 7 PH
POC PROTEIN, URINE: NEGATIVE MG/DL
POC SPECIFIC GRAVITY, URINE: 1.01
POC UROBILINOGEN, URINE: 0.2 EU/DL

## 2024-10-14 PROCEDURE — 81003 URINALYSIS AUTO W/O SCOPE: CPT | Performed by: OBSTETRICS & GYNECOLOGY

## 2024-10-14 PROCEDURE — 99213 OFFICE O/P EST LOW 20 MIN: CPT | Performed by: OBSTETRICS & GYNECOLOGY

## 2024-10-14 PROCEDURE — 87086 URINE CULTURE/COLONY COUNT: CPT | Mod: WESLAB | Performed by: OBSTETRICS & GYNECOLOGY

## 2024-10-14 PROCEDURE — 3008F BODY MASS INDEX DOCD: CPT | Performed by: OBSTETRICS & GYNECOLOGY

## 2024-10-14 ASSESSMENT — ENCOUNTER SYMPTOMS
DEPRESSION: 0
OCCASIONAL FEELINGS OF UNSTEADINESS: 0
LOSS OF SENSATION IN FEET: 0

## 2024-10-14 ASSESSMENT — LIFESTYLE VARIABLES
HOW MANY STANDARD DRINKS CONTAINING ALCOHOL DO YOU HAVE ON A TYPICAL DAY: 1 OR 2
HOW OFTEN DO YOU HAVE A DRINK CONTAINING ALCOHOL: 2-4 TIMES A MONTH
SKIP TO QUESTIONS 9-10: 1
AUDIT-C TOTAL SCORE: 2
HOW OFTEN DO YOU HAVE SIX OR MORE DRINKS ON ONE OCCASION: NEVER

## 2024-10-14 ASSESSMENT — PAIN SCALES - GENERAL: PAINLEVEL: 0-NO PAIN

## 2024-10-14 ASSESSMENT — PATIENT HEALTH QUESTIONNAIRE - PHQ9
SUM OF ALL RESPONSES TO PHQ9 QUESTIONS 1 & 2: 0
1. LITTLE INTEREST OR PLEASURE IN DOING THINGS: NOT AT ALL
2. FEELING DOWN, DEPRESSED OR HOPELESS: NOT AT ALL

## 2024-10-16 DIAGNOSIS — R10.2 PELVIC PAIN IN FEMALE: ICD-10-CM

## 2024-10-16 DIAGNOSIS — B99.9 GENITAL INFECTION: Primary | ICD-10-CM

## 2024-10-16 DIAGNOSIS — N34.1 NGU (NONGONOCOCCAL URETHRITIS): ICD-10-CM

## 2024-10-16 LAB — BACTERIA UR CULT: NO GROWTH

## 2024-10-16 RX ORDER — AZITHROMYCIN 250 MG/1
TABLET, FILM COATED ORAL
Qty: 6 TABLET | Refills: 1 | Status: SHIPPED | OUTPATIENT
Start: 2024-10-16 | End: 2024-10-21

## 2024-10-25 ENCOUNTER — TELEPHONE (OUTPATIENT)
Dept: OBSTETRICS AND GYNECOLOGY | Facility: CLINIC | Age: 24
End: 2024-10-25
Payer: COMMERCIAL

## 2024-10-25 DIAGNOSIS — N34.1 NGU (NONGONOCOCCAL URETHRITIS): Primary | ICD-10-CM

## 2024-10-25 DIAGNOSIS — B99.9 GENITAL INFECTION: ICD-10-CM

## 2024-10-25 DIAGNOSIS — R39.9 UTI SYMPTOMS: ICD-10-CM

## 2024-10-25 DIAGNOSIS — R10.2 PELVIC PAIN IN FEMALE: ICD-10-CM

## 2024-10-28 RX ORDER — AZITHROMYCIN 250 MG/1
TABLET, FILM COATED ORAL
Qty: 6 TABLET | Refills: 1 | Status: SHIPPED | OUTPATIENT
Start: 2024-10-28 | End: 2024-11-02

## 2024-10-28 RX ORDER — METRONIDAZOLE 500 MG/1
500 TABLET ORAL EVERY 12 HOURS
Qty: 14 TABLET | Refills: 0 | Status: SHIPPED | OUTPATIENT
Start: 2024-10-28 | End: 2024-11-04

## 2024-11-04 ENCOUNTER — PATIENT MESSAGE (OUTPATIENT)
Dept: OBSTETRICS AND GYNECOLOGY | Facility: CLINIC | Age: 24
End: 2024-11-04
Payer: COMMERCIAL

## 2024-11-11 NOTE — PATIENT COMMUNICATION
I spoke to pt and she stated she is not sure if just lingering symptoms, but she wanted to make an appt for retesting and discussing further tx. Pt sched for 12/2 @9:45am

## 2024-11-12 NOTE — TELEPHONE ENCOUNTER
Great, ask her to test at the very and engage in an elimination diet which she can find on either website - [facility] or [employer].  I would like to learn whether or not such a diet reduces her symptoms (for example avoiding alcohol, sodas, high potassium foods or beverages, acidic foods and beverages).  Do this for the next 3 weeks leading to her follow-up visit and keep a symptom diary.  Okay to participate in Thanksgiving with her family or friends and keep a diary as to whether or not her symptoms return following her intake.

## 2024-11-12 NOTE — PATIENT COMMUNICATION
Spoke to pt she is agreeable to plan. She will log food intake and symptoms and bring to her next OV so we can copy and put in her chart .

## 2024-12-02 ENCOUNTER — OFFICE VISIT (OUTPATIENT)
Dept: OBSTETRICS AND GYNECOLOGY | Facility: CLINIC | Age: 24
End: 2024-12-02
Payer: COMMERCIAL

## 2024-12-02 VITALS
DIASTOLIC BLOOD PRESSURE: 73 MMHG | BODY MASS INDEX: 21.99 KG/M2 | WEIGHT: 128.8 LBS | SYSTOLIC BLOOD PRESSURE: 105 MMHG | HEIGHT: 64 IN

## 2024-12-02 DIAGNOSIS — N34.1 NGU (NONGONOCOCCAL URETHRITIS): Primary | ICD-10-CM

## 2024-12-02 DIAGNOSIS — Z30.41 SURVEILLANCE FOR BIRTH CONTROL, ORAL CONTRACEPTIVES: ICD-10-CM

## 2024-12-02 DIAGNOSIS — R10.2 PELVIC PAIN IN FEMALE: ICD-10-CM

## 2024-12-02 PROCEDURE — 3008F BODY MASS INDEX DOCD: CPT | Performed by: OBSTETRICS & GYNECOLOGY

## 2024-12-02 PROCEDURE — 99213 OFFICE O/P EST LOW 20 MIN: CPT | Performed by: OBSTETRICS & GYNECOLOGY

## 2024-12-02 PROCEDURE — 1036F TOBACCO NON-USER: CPT | Performed by: OBSTETRICS & GYNECOLOGY

## 2024-12-02 RX ORDER — NORGESTIMATE AND ETHINYL ESTRADIOL 7DAYSX3 28
1 KIT ORAL DAILY
Qty: 84 TABLET | Refills: 3 | Status: SHIPPED | OUTPATIENT
Start: 2024-12-02

## 2024-12-02 ASSESSMENT — ENCOUNTER SYMPTOMS
LOSS OF SENSATION IN FEET: 0
DEPRESSION: 0
OCCASIONAL FEELINGS OF UNSTEADINESS: 0

## 2024-12-02 ASSESSMENT — LIFESTYLE VARIABLES
HOW MANY STANDARD DRINKS CONTAINING ALCOHOL DO YOU HAVE ON A TYPICAL DAY: 1 OR 2
HOW OFTEN DO YOU HAVE A DRINK CONTAINING ALCOHOL: MONTHLY OR LESS
HOW OFTEN DO YOU HAVE SIX OR MORE DRINKS ON ONE OCCASION: NEVER
SKIP TO QUESTIONS 9-10: 1
AUDIT-C TOTAL SCORE: 1

## 2024-12-02 ASSESSMENT — PAIN SCALES - GENERAL: PAINLEVEL_OUTOF10: 1

## 2024-12-02 NOTE — PATIENT INSTRUCTIONS
-Patient is scheduled to see a urologist to evaluate for possible IC or other etiology of her urethritis symptoms.  -Initiating pelvic floor physical therapy for post exam pain and history of discomfort during intercourse.  -Recollected 1 swab today.  -Continue women's health probiotic and elimination diet.  Please review with the urologist as well.

## 2024-12-02 NOTE — PROGRESS NOTES
GYN OFFICE VISIT    Patient Name:  Flor Potter  :  2000  MR #:  [id number]  Acct #:  [id number]      ASSESSMENT/PLAN:   Diagnoses and all orders for this visit:  MARYAM (nongonococcal urethritis)  -     One swab; Other-indicate in comment ([facility]); Vaginitis, Ureap/Mycop - Miscellaneous Test  Pelvic pain in female  -     One swab; Other-indicate in comment ([facility]); Vaginitis, Ureap/Mycop - Miscellaneous Test  -     Referral to Physical Therapy; Future  Surveillance for birth control, oral contraceptives  -     norgestimate-ethinyl estradioL (Ortho Tri-Cyclen,Trinessa) 0.18/0.215/0.25 mg-35 mcg (28) tablet; Take 1 tablet by mouth once daily.    -To see a urologist.  Concerned about IC.  - Preventative hygiene/STIs reviewed.   - Will continue elimination diet since she has noticed alleviation of symptoms with removal of inciting agents - acids, spices, caffeine, others.   - Trying pelvic floor physical therapy  - Will continue women's health probiotic.  - Wishes to resume previous birth control, higher dose of Ortho tri cyclen.  Symptoms were less on the previous OCP.  Not sure which partner sxs started with, current or previous.   -Current partner has been treated and ENZO is negative.  - OTC Medication education:   All new and/or current medications discussed and reviewed including side effects with patient/caregiver, Understanding:  Caregiver/Patient expressed understanding., Adherence:  Barriers to adherence identified and discussed if present.      Follow-up as needed.       Chief Complaint   Patient presents with    Vaginitis/Bacterial Vaginosis     Retest for urea/mycoplasma also          Flor Potter is a 24 y.o. C. Female who presents for MARYAM follow-up.   Patient's last menstrual period was 2024 (exact date)..  Started elimination diet and has noticed a difference with removal of spicy foods certain acids certain caffeine, avoid alcohol.  Did not have burning on these days.  As  for intercourse, she typically felt a discomfort of pressure.  Denies OAB symptoms.  Was treated for , his follow-up urine culture for Ureaplasma/mycoplasma returned negative  She cannot determine if onset of her problems were with previous partner or current partner.  Definitely believes that her symptoms have been more noticeable on the lower dose OCP.    Past Medical History:   Diagnosis Date    Acne, unspecified     Acne, unspecified acne type    Encounter for immunization     Varicella vaccination    Encounter for immunization     Need for HPV vaccination       Social History     Tobacco Use    Smoking status: Never    Smokeless tobacco: Never   Vaping Use    Vaping status: Never Used   Substance Use Topics    Alcohol use: Yes    Drug use: Never        Family History   Problem Relation Name Age of Onset    No Known Problems Mother      No Known Problems Father           Current Outpatient Medications:     fluconazole (Diflucan) 150 mg tablet, Take after completion of antibiotic.  Take 1 tab po once, repeat in 72 hours., Disp: 2 tablet, Rfl: 0    norgestimate-ethinyl estradioL (Ortho Tri-Cyclen,Trinessa) 0.18/0.215/0.25 mg-35 mcg (28) tablet, Take 1 tablet by mouth once daily., Disp: 84 tablet, Rfl: 3     Allergies   Allergen Reactions    Amoxicillin Anaphylaxis and Rash    Minocycline Hives          ROS:  [facility] - GENERAL:          Chills no.  Fever no.  Loss of Weight no.  Sweats no.  Fatigue no.  Weight gain no.      [facility] - RESPIRATORY:          Shortness of breath no.    [facility] - CARDIOVASCULAR:          Chest Pain no.  High Blood Pressure no.  Irregular Heart Beat no.   Rapid Heart Beat no.  Swelling of ankles no.    [facility] - GASTROINTESTINAL:          Appetite Poor no.  Bloating no.  Constipation no.  Diarrhea no.   Indigestion no.  Nausea no.  Stomach Pain no  Vomiting no.    [facility] - GENITO-URINARY:          Blood in Urine no.  Frequent Urination no.  Lack of Bladder Control no.  Painful Urination sometimes  "Heavy/Irregular periods no .  Vaginal discharge no.  Vaginal odor no.  Vaginal itching no.  Post-coital bleeding no.      [facility] - MUSCLE/JOINT/BONE:          Back no.  Joint pain no  Muscle pain no.      [facility] - SKIN:          Bruise easily no.  Itching no.    Rash no.  Sore that won't heal no.  Vulvar Lesions no.    [facility] - ROS Update:          Depression or anxiety problems no.      OBJECTIVE:   /73   Ht 1.626 m (5' 4\")   Wt 58.4 kg (128 lb 12.8 oz)   LMP 11/27/2024 (Exact Date)   BMI 22.11 kg/m²   Body mass index is 22.11 kg/m².     Physical Exam  GENERAL:   General Appearance:  well-developed, well-nourished, no functional handicap, well-groomed.  Hygiene:  good.  Ill-appearance:  none.    HEENT: NC/AT head.  Neck is supple.  Speech:  clear.  Eye contact:  normal.  LUNGS:  Normal effort; no respiratory distress.    HEART: RRR with S1/S2 w/o M/C/R  ABDOMEN: Tenderness:  none.  Distention:  none.   GENITOURINARY - FEMALE: Bladder:  normal.  Pelvic support defects:  not seen .  External genitalia:  Normal.  Urethra:  Normal.  Vagina:  no lesions, moderate discharge, genital swabs collected no/yes.  Cervix/ cuff:  normal appearance . Menstrual flow is light, brown (1st day).   Uterus:  normal size/shape/consistency, non tender.  Adnexa:  normal , non tender.  Patient reports some suprapubic tenderness following the bimanual exam, not during.  DERMATOLOGY:  Skin: No acute rash or lesions.  EXTREMITIES: Normal:  no anomalies.  Edema:  none.    NEUROLOGICAL: Orientation:  alert and oriented x 3.   PSYCHOLOGY: Affect:  appropriate.  Mood:  pleasant.     Previous/current LABS reviewed: Yes  Previous/current RADIOLOGY reviewed: Yes    Note: This dictation was generated using Dragon voice recognition software. Please excuse any grammatical or spelling errors that may have occurred using the system.    "

## 2024-12-03 ENCOUNTER — TELEPHONE (OUTPATIENT)
Dept: OBSTETRICS AND GYNECOLOGY | Facility: CLINIC | Age: 24
End: 2024-12-03
Payer: COMMERCIAL

## 2024-12-03 DIAGNOSIS — N34.1 NONGONOCOCCAL URETHRITIS DUE TO UREAPLASMA UREALYTICUM: Primary | ICD-10-CM

## 2024-12-03 DIAGNOSIS — A49.3 NONGONOCOCCAL URETHRITIS DUE TO UREAPLASMA UREALYTICUM: Primary | ICD-10-CM

## 2024-12-03 RX ORDER — AZITHROMYCIN 250 MG/1
TABLET, FILM COATED ORAL
Qty: 6 TABLET | Refills: 1 | Status: SHIPPED | OUTPATIENT
Start: 2024-12-03 | End: 2024-12-08

## 2024-12-03 NOTE — PROGRESS NOTES
12/3/24 one swab positive again for ureaplasma. Antimicrobiogram reviewed. Discussed results with the patient. She will be seeing Dr. Palencia on 12/5.   Sent Rx for Azithromycin  to take back to back for 2 packs if Urology agrees.

## 2025-02-09 ENCOUNTER — PATIENT MESSAGE (OUTPATIENT)
Dept: OBSTETRICS AND GYNECOLOGY | Facility: CLINIC | Age: 25
End: 2025-02-09
Payer: COMMERCIAL

## 2025-02-14 DIAGNOSIS — B37.9 YEAST INFECTION: ICD-10-CM

## 2025-02-14 RX ORDER — FLUCONAZOLE 150 MG/1
TABLET ORAL
Qty: 2 TABLET | Refills: 1 | Status: SHIPPED | OUTPATIENT
Start: 2025-02-14

## 2025-03-07 ENCOUNTER — OFFICE VISIT (OUTPATIENT)
Dept: OBSTETRICS AND GYNECOLOGY | Facility: CLINIC | Age: 25
End: 2025-03-07
Payer: COMMERCIAL

## 2025-03-07 VITALS
HEIGHT: 64 IN | SYSTOLIC BLOOD PRESSURE: 105 MMHG | DIASTOLIC BLOOD PRESSURE: 75 MMHG | WEIGHT: 129 LBS | BODY MASS INDEX: 22.02 KG/M2

## 2025-03-07 DIAGNOSIS — N90.89 VULVAR FISSURE: ICD-10-CM

## 2025-03-07 DIAGNOSIS — N92.6 IRREGULAR MENSTRUATION: ICD-10-CM

## 2025-03-07 DIAGNOSIS — B37.9 YEAST INFECTION: ICD-10-CM

## 2025-03-07 DIAGNOSIS — N39.0 RECURRENT URINARY TRACT INFECTION: ICD-10-CM

## 2025-03-07 DIAGNOSIS — N76.0 VULVOVAGINITIS: Primary | ICD-10-CM

## 2025-03-07 LAB
POC APPEARANCE, URINE: ABNORMAL
POC BILIRUBIN, URINE: NEGATIVE
POC BLOOD, URINE: NEGATIVE
POC COLOR, URINE: YELLOW
POC GLUCOSE, URINE: NEGATIVE MG/DL
POC KETONES, URINE: NEGATIVE MG/DL
POC LEUKOCYTES, URINE: NEGATIVE
POC NITRITE,URINE: NEGATIVE
POC PH, URINE: 7 PH
POC PROTEIN, URINE: NEGATIVE MG/DL
POC SPECIFIC GRAVITY, URINE: 1.01
POC UROBILINOGEN, URINE: 0.2 EU/DL

## 2025-03-07 PROCEDURE — 81003 URINALYSIS AUTO W/O SCOPE: CPT | Mod: QW | Performed by: OBSTETRICS & GYNECOLOGY

## 2025-03-07 PROCEDURE — 3008F BODY MASS INDEX DOCD: CPT | Performed by: OBSTETRICS & GYNECOLOGY

## 2025-03-07 PROCEDURE — 1036F TOBACCO NON-USER: CPT | Performed by: OBSTETRICS & GYNECOLOGY

## 2025-03-07 PROCEDURE — 99213 OFFICE O/P EST LOW 20 MIN: CPT | Performed by: OBSTETRICS & GYNECOLOGY

## 2025-03-07 PROCEDURE — 99213 OFFICE O/P EST LOW 20 MIN: CPT | Mod: 25 | Performed by: OBSTETRICS & GYNECOLOGY

## 2025-03-07 RX ORDER — NYSTATIN AND TRIAMCINOLONE ACETONIDE 100000; 1 [USP'U]/G; MG/G
OINTMENT TOPICAL
Qty: 15 G | Refills: 2 | Status: SHIPPED | OUTPATIENT
Start: 2025-03-07

## 2025-03-07 ASSESSMENT — ENCOUNTER SYMPTOMS
DEPRESSION: 0
OCCASIONAL FEELINGS OF UNSTEADINESS: 0
LOSS OF SENSATION IN FEET: 0

## 2025-03-07 NOTE — PROGRESS NOTES
"Subjective   Flor Potter is a 25 y.o. female who presents for vaginal discharge that she treated with 2 tablets of fluconazole a few days apart but still has external itching.  Duration: Over a week  Color:-   white/yellow  Odor:   no  Irritation:   itching/burning  New partner:   no  Sexual history reviewed with the patient.   STI Exposure:   denies.  Contraception:   None.  STI history?  MARYAM  She engages in hot yoga frequently.  She is a  also.  States she does have trouble with constipation periodically.  States she came off the birth control pill which helped to reduce her recurring UTI.  She is trying to keep track of her cycles though.  She has been consulting with a naturopath.  Asking for an order for Natural cycle.    OB History          0    Para   0    Term   0       0    AB   0    Living   0         SAB   0    IAB   0    Ectopic   0    Multiple   0    Live Births   0                  Patient's last menstrual period was 2025.       Objective   Ht 1.626 m (5' 4\")   Wt 58.5 kg (129 lb)   LMP 2025   BMI 22.14 kg/m²             General: Alert and oriented, in NAD   Abdomen: normal   Vulva: Slight gray pallor of sulci of vulva.     Vagina: Moderate yellowish, slight gray discharge   Cervix: normal   Uterus: Not examined   Adnexa: Not examined   Perineal body From the 4 introitus to upper perineal body there is a fissure that is erythematous.  Treated with silver nitrate with permission.   Cultures: done     Assessment and Plan  Diagnoses and all orders for this visit:  Vulvovaginitis  -     One Swab; [facility] Vulvovaginitis (BV, Yeast) - Miscellaneous Test  -     nystatin-triamcinolone (Mycolog II) ointment; Apply pea-sized amount to affected genital area externally twice daily, rub in thoroughly for > 30 sec for 7 - 10 days for external yeast infection.  Vulvar fissure  Recurrent urinary tract infection  -     POCT UA Automated " manually resulted  Irregular menstruation  -     conception assist supp cmb  1 kit; 1 kit once daily. Natural Cycle yearly subscription  Yeast infection  -     nystatin-triamcinolone (Mycolog II) ointment; Apply pea-sized amount to affected genital area externally twice daily, rub in thoroughly for > 30 sec for 7 - 10 days for external yeast infection.  Preventative hygiene reviewed: 5 vaginal powder sprinkled onto undergarment before workout. Moisturize your skin regularly through the week with coconut oil.  You are provided for natural cycle.  Not trying for pregnancy, just trying to keep track of her ovulation.    Medication education:   All new and/or current medications discussed and reviewed including side effects with patient/caregiver, Understanding:  Caregiver/Patient expressed understanding., Adherence:  Barriers to adherence identified and discussed if present.

## 2025-03-10 ENCOUNTER — TELEPHONE (OUTPATIENT)
Dept: OBSTETRICS AND GYNECOLOGY | Facility: CLINIC | Age: 25
End: 2025-03-10
Payer: COMMERCIAL

## 2025-03-14 DIAGNOSIS — N34.1 NON-GONOCOCCAL URETHRITIS (NGU): Primary | ICD-10-CM

## 2025-03-14 DIAGNOSIS — B99.9 GENITAL INFECTION: ICD-10-CM

## 2025-03-14 RX ORDER — AZITHROMYCIN 250 MG/1
TABLET, FILM COATED ORAL
Qty: 11 TABLET | Refills: 0 | Status: SHIPPED | OUTPATIENT
Start: 2025-03-14 | End: 2025-03-19

## 2025-06-19 ENCOUNTER — OFFICE VISIT (OUTPATIENT)
Dept: PRIMARY CARE | Facility: CLINIC | Age: 25
End: 2025-06-19
Payer: COMMERCIAL

## 2025-06-19 VITALS
SYSTOLIC BLOOD PRESSURE: 110 MMHG | DIASTOLIC BLOOD PRESSURE: 68 MMHG | BODY MASS INDEX: 21.85 KG/M2 | HEIGHT: 64 IN | WEIGHT: 128 LBS

## 2025-06-19 DIAGNOSIS — F40.243 ANXIETY WITH FLYING: Primary | ICD-10-CM

## 2025-06-19 DIAGNOSIS — N60.11 FIBROCYSTIC BREAST CHANGES, RIGHT: ICD-10-CM

## 2025-06-19 PROCEDURE — 3008F BODY MASS INDEX DOCD: CPT | Performed by: INTERNAL MEDICINE

## 2025-06-19 PROCEDURE — 99214 OFFICE O/P EST MOD 30 MIN: CPT | Performed by: INTERNAL MEDICINE

## 2025-06-19 RX ORDER — HYDROXYZINE PAMOATE 25 MG/1
25 CAPSULE ORAL 3 TIMES DAILY PRN
Qty: 30 CAPSULE | Refills: 0 | Status: SHIPPED | OUTPATIENT
Start: 2025-06-19 | End: 2025-06-29

## 2025-06-19 NOTE — PROGRESS NOTES
Subjective   Patient ID: Flor Potter is a 25 y.o. female who presents for Anxiety (Follow up and anxiety on upcoming flight [place] //Right breast small tissue pain    ).    HPI   Patient is here for anxiety  Patient is going to [place] for her friend's birthday and very anxious about flying   Patient is also due complaining of mild lump in the right breast     past recap  Patient is here for follow-up on the blood work  She has questions about low blood sugar and elevated RBC and hematocrit on the blood work  Complaining of having vaginal infection gets fungal infection she blames it to the birth control.  She is really concerned about taking medication but it gives her side effects  She wants Diflucan as nystatin does not work well for her  She does hot yoga for 5 times a week  She did not do the lung function test      Past recap   she presents with complaints of having extreme fatigue for past 1 months she saw her psychic and energy healer and they recommended to get checked for thyroid  She is always stuffy she gets sometimes wheezy in the lung     Patient is non-smoker social drinker  Does office job     Patient is here with complaints of having sinus congestion chest congestion headache stomach hurting  Her mother is positive for Covid she contracted infection from her work  Patient denies any shortness of breath denies cough     to est PCP   feel emotional off and on   In April patient felt very emotional for few days and then she felt better until a week ago when she started feeling very emotional again  She has regular periods  She feels this time it's happening a week before her period  Smallest things ruins her day, extra since still gets upset easily, crying a lot  She does have a boyfriend but no breakouts  She is a day scholar, she is taking summer courses and working as   Not enjoying the work  Was on Accutane until a year ago for acne   Review of Systems    Objective   /68   " Ht 1.626 m (5' 4\")   Wt 58.1 kg (128 lb)   BMI 21.97 kg/m²     Physical Exam  Vitals reviewed.   Constitutional:       Appearance: Normal appearance.   HENT:      Head: Normocephalic and atraumatic.      Right Ear: Tympanic membrane, ear canal and external ear normal.      Left Ear: Tympanic membrane, ear canal and external ear normal.      Nose: Nose normal.      Mouth/Throat:      Pharynx: Oropharynx is clear.   Eyes:      Extraocular Movements: Extraocular movements intact.      Conjunctiva/sclera: Conjunctivae normal.      Pupils: Pupils are equal, round, and reactive to light.   Cardiovascular:      Rate and Rhythm: Normal rate and regular rhythm.      Pulses: Normal pulses.      Heart sounds: Normal heart sounds.   Pulmonary:      Effort: Pulmonary effort is normal.      Breath sounds: Normal breath sounds.   Abdominal:      General: Abdomen is flat. Bowel sounds are normal.      Palpations: Abdomen is soft.   Musculoskeletal:      Cervical back: Normal range of motion and neck supple.   Skin:     General: Skin is warm and dry.   Neurological:      General: No focal deficit present.      Mental Status: She is alert and oriented to person, place, and time.   Psychiatric:         Mood and Affect: Mood normal.         Assessment/Plan   Problem List Items Addressed This Visit    None  Visit Diagnoses         Anxiety with flying    -  Primary    Relevant Medications    hydrOXYzine pamoate (VistariL) 25 mg capsule      Fibrocystic breast changes, right                  past recap  Patient's old chart review  #1 high triglycerides  Will recheck if it was just related to Accutane  #2 palpitations  We will do blood work to rule out any medical cause  #3 anxiety  Long counseling session done  Patient has questions about supplements explain not able to give much information but she can try aromatic therapies which does help her relax  Advised the over and breathing exercises  Advised to consider lifestyle "   Patient does not want to take medications but if her anxiety is are not better she should be on medication  Follow-up after blood work     11/24  Covid testing ordered for patient  Advised patient to quarantine  Stay hydrated and rest      8/10/2023  Will order thyroid test  CBC to rule out anemia  Will do allergy test to see if patient has allergies causing sinus congestion  Sinus congestion could be causing her fatigue to  Follow-up after the test    8/24/2023  Blood work reviewed  Low blood sugar reactive hypoglycemia  Increase water fiber intake  Elevated hematocrit most likely from dehydration increase water intake  PFTs  Vaginal infection probably from heart failure she does for 5 times a week as patient does not change the clothes right away  Encouraged avoid sweating keep the skin dry and keep cotton clothing  Prescription for Diflucan and nystatin cream given    6/19/2025  Will give prescription for Vistaril to help with anxiety  Breast examination shows patient has fibrocystic breast no definite lump noted  Advised to cut down caffeine  Patient says it does get tender during the periods  If not better with consider doing ultrasound

## 2025-07-08 DIAGNOSIS — F41.9 ANXIETY: ICD-10-CM

## 2025-07-08 RX ORDER — ALPRAZOLAM 0.25 MG/1
0.25 TABLET ORAL 3 TIMES DAILY PRN
Qty: 2 TABLET | Refills: 0 | Status: SHIPPED | OUTPATIENT
Start: 2025-07-08 | End: 2026-03-05